# Patient Record
Sex: FEMALE | Race: ASIAN | NOT HISPANIC OR LATINO
[De-identification: names, ages, dates, MRNs, and addresses within clinical notes are randomized per-mention and may not be internally consistent; named-entity substitution may affect disease eponyms.]

---

## 2022-05-03 ENCOUNTER — APPOINTMENT (OUTPATIENT)
Dept: GASTROENTEROLOGY | Facility: CLINIC | Age: 47
End: 2022-05-03
Payer: COMMERCIAL

## 2022-05-03 VITALS
SYSTOLIC BLOOD PRESSURE: 130 MMHG | BODY MASS INDEX: 22.99 KG/M2 | WEIGHT: 138 LBS | HEIGHT: 65 IN | DIASTOLIC BLOOD PRESSURE: 90 MMHG

## 2022-05-03 DIAGNOSIS — Z12.11 ENCOUNTER FOR SCREENING FOR MALIGNANT NEOPLASM OF COLON: ICD-10-CM

## 2022-05-03 DIAGNOSIS — Z78.9 OTHER SPECIFIED HEALTH STATUS: ICD-10-CM

## 2022-05-03 PROCEDURE — 99204 OFFICE O/P NEW MOD 45 MIN: CPT

## 2022-05-03 NOTE — HISTORY OF PRESENT ILLNESS
[FreeTextEntry1] : She  is an  asymptomatic 46-year-old female referred for a screening colonoscopy.  She denies any family history of colon cancer

## 2022-05-03 NOTE — ASSESSMENT
[FreeTextEntry1] : RUBI NOYOLA was advised to undergo colonoscopy to which she agreed. The procedure will be performed in [facility] \par [facility] with the assistance of an anesthesiologist. The patient was given a Suprep preparation prescription and understood the \par procedure as it was explained to her. She was given a booklet distributed by the [employer]\par  Endoscopy explaining the procedure in detail and she understood the risks of the procedure not limited to infection, bleeding,\par perforation or non- diagnosis of colorectal cancer. She was advised that she could not drive home, if she chooses to\par  receive sedation.\par \par Further diagnostic and treatment recommendations will be based upon the procedure and any biopsies, if they are taken.\par \par Thank you for allowing me to participate in this [facility] health care.\par \par , Best personal regards -- Don\par

## 2022-05-03 NOTE — CONSULT LETTER
[Dear  ___] : Dear  [unfilled], [Consult Letter:] : I had the pleasure of evaluating your patient, [unfilled]. [( Thank you for referring [unfilled] for consultation for _____ )] : Thank you for referring [unfilled] for consultation for [unfilled] [Please see my note below.] : Please see my note below. [Consult Closing:] : Thank you very much for allowing me to participate in the care of this patient.  If you have any questions, please do not hesitate to contact me. [Sincerely,] : Sincerely, [FreeTextEntry3] : Don\par \par Jose Elam MD\par \par Gastroenterology\par [facility]\par [facility]\par \par

## 2022-06-29 ENCOUNTER — APPOINTMENT (OUTPATIENT)
Dept: GASTROENTEROLOGY | Facility: AMBULATORY SURGERY CENTER | Age: 47
End: 2022-06-29

## 2022-06-29 ENCOUNTER — RESULT REVIEW (OUTPATIENT)
Age: 47
End: 2022-06-29

## 2022-06-29 PROCEDURE — 45380 COLONOSCOPY AND BIOPSY: CPT | Mod: 33

## 2023-08-30 ENCOUNTER — EMERGENCY (EMERGENCY)
Facility: HOSPITAL | Age: 48
LOS: 1 days | Discharge: HOME | End: 2023-08-30
Attending: EMERGENCY MEDICINE
Payer: COMMERCIAL

## 2023-08-30 VITALS
SYSTOLIC BLOOD PRESSURE: 148 MMHG | OXYGEN SATURATION: 99 % | HEART RATE: 87 BPM | TEMPERATURE: 99 F | RESPIRATION RATE: 20 BRPM | HEIGHT: 65 IN | DIASTOLIC BLOOD PRESSURE: 101 MMHG | WEIGHT: 139.99 LBS

## 2023-08-30 VITALS
RESPIRATION RATE: 16 BRPM | OXYGEN SATURATION: 97 % | SYSTOLIC BLOOD PRESSURE: 135 MMHG | DIASTOLIC BLOOD PRESSURE: 87 MMHG | TEMPERATURE: 98 F | HEART RATE: 77 BPM

## 2023-08-30 PROCEDURE — 73552 X-RAY EXAM OF FEMUR 2/>: CPT | Mod: 26,RT

## 2023-08-30 PROCEDURE — 73501 X-RAY EXAM HIP UNI 1 VIEW: CPT

## 2023-08-30 PROCEDURE — 99284 EMERGENCY DEPT VISIT MOD MDM: CPT

## 2023-08-30 PROCEDURE — 73552 X-RAY EXAM OF FEMUR 2/>: CPT

## 2023-08-30 PROCEDURE — 73560 X-RAY EXAM OF KNEE 1 OR 2: CPT | Mod: 26,RT

## 2023-08-30 PROCEDURE — 73560 X-RAY EXAM OF KNEE 1 OR 2: CPT

## 2023-08-30 PROCEDURE — 99284 EMERGENCY DEPT VISIT MOD MDM: CPT | Mod: 25

## 2023-08-30 PROCEDURE — 73501 X-RAY EXAM HIP UNI 1 VIEW: CPT | Mod: 26,RT

## 2023-08-30 RX ORDER — IBUPROFEN 200 MG
600 TABLET ORAL ONCE
Refills: 0 | Status: COMPLETED | OUTPATIENT
Start: 2023-08-30 | End: 2023-08-30

## 2023-08-30 RX ADMIN — Medication 600 MILLIGRAM(S): at 21:31

## 2023-08-30 NOTE — ED PROVIDER NOTE - PHYSICAL EXAMINATION
CONSTITUTIONAL: Well appearing, awake, alert, oriented to person, place, time/situation and in no apparent distress.  ENMT: Airway patent, Nasal mucosa clear. Mouth with normal mucosa. No rubin sign no racooon eyes, no intraoral trauma  EYES: Clear bilaterally, pupils equal, round and reactive to light.  CARDIAC: Normal rate, regular rhythm.  Heart sounds S1, S2.  No murmurs, rubs or gallops.  RESPIRATORY: Breath sounds clear and equal bilaterally.   GASTROINTESTINAL: soft, nontender, no rebound, no guarding   MUSCULOSKELETAL: right sided TTP along greater trochanter and anterior hip although full ROM, full ROM of right knee with minimal lateral joint line TTP but no effusion  NEUROLOGICAL: Alert and oriented, no focal deficits, no motor or sensory deficits.   SKIN: No bruising, no lacerations, no rash, no signs of external trauma CONSTITUTIONAL: Well appearing, awake, alert, oriented to person, place, time/situation and in no apparent distress.  ENMT: Airway patent, Nasal mucosa clear. Mouth with normal mucosa. No rubin sign no racooon eyes, no intraoral trauma  EYES: Clear bilaterally, pupils equal, round and reactive to light.  CARDIAC: Normal rate, regular rhythm.  Heart sounds S1, S2.  No murmurs, rubs or gallops.  RESPIRATORY: Breath sounds clear and equal bilaterally.   GASTROINTESTINAL: soft, nontender, no rebound, no guarding   MUSCULOSKELETAL: right sided TTP along greater trochanter and anterior hip although full ROM, full ROM of right knee with minimal lateral joint line TTP but no effusion, no TTP over left elbow or left radius/ulna  NEUROLOGICAL: Alert and oriented, no focal deficits, no motor or sensory deficits.   SKIN: No bruising, no lacerations, no rash, no signs of external trauma

## 2023-08-30 NOTE — ED PROVIDER NOTE - NSFOLLOWUPINSTRUCTIONS_ED_ALL_ED_FT
You were seen in the emergency department after a fall.  We did x-ray imaging of your hip and knee which showed no injury.  This is likely either a soft tissue bruise or a deep bone bruise which will improve with time.  Continue to rest as needed, continue to take Tylenol Motrin as needed, please follow-up with your primary care doctor.

## 2023-08-30 NOTE — ED PROVIDER NOTE - CLINICAL SUMMARY MEDICAL DECISION MAKING FREE TEXT BOX
47-year-old female history of vertigo presenting with a mechanical fall after tripping on a object in the office and falling forward landing on her hip.  No obvious signs of bruising has good full range of motion which is reassuring but has tenderness and a limp to the right side along the hip and knee.  We will get plain film imaging.  Given lack of head strike will hold off on CT scan of the head after discussion with patient 47-year-old female history of vertigo presenting with a mechanical fall after tripping on a object in the office and falling forward landing on her hip.  No obvious signs of bruising has good full range of motion which is reassuring but has tenderness and a limp to the right side along the hip and knee.  We will get plain film imaging.  Given lack of head strike will hold off on CT scan of the head after discussion with patient  Frankie:  47 year old female s/p mehcanical fall after trippin on object in office and falling. PE: alert, nad, nc/at, nonlabored respirations, + right sided ttp along greatrochanter, anterior hip. FROM of right knee and right hip.  + ttp of right kknee. alert and oriented x 3, will get imaging. no head injury/c-spine injury. will give pain control, reassess.

## 2023-08-30 NOTE — ED PROVIDER NOTE - OBJECTIVE STATEMENT
47-year-old female with a history of vertigo otherwise no medical problems presenting to the emergency department after what sounds to be a mechanical fall earlier today around noon.  Patient was walking, tripped over a something in the office and fell forward.  She did not hit her head did not lose consciousness mostly landed on the right hip and leg.  She been able to walk on it throughout the day but has been having increasing pain despite ibuprofen.  She initially had some left arm pain but that is since improved.  No pain to the back, no headache no difficulty ambulating

## 2023-08-30 NOTE — ED ADULT NURSE NOTE - NSFALLRISKINTERV_ED_ALL_ED

## 2023-08-30 NOTE — ED ADULT NURSE NOTE - OBJECTIVE STATEMENT
patient is a 47 y/o F with no pertinent PMH who presents to the ED c/o right hip pain s/p mechanical fall. patient states that she was walking in her office when she tripped on something, fell forward mostly landing on her right hip. patient ambulatory after the incident however pain has not improved with tylenol taken PTA. patient with full ROM. cap refill <2 seconds, sensation intact. resting in NAD. MD aCrney at bedside to assess. pending XRs. updated on plan of care. comfort and safety maintained

## 2023-08-30 NOTE — ED PROVIDER NOTE - PATIENT PORTAL LINK FT
You can access the FollowMyHealth Patient Portal offered by [facility] by registering at the following website: [email or web address]. By joining [facility]’s FollowMyHealth portal, you will also be able to view your health information using other applications (apps) compatible with our system.

## 2023-08-30 NOTE — ED PROVIDER NOTE - PROGRESS NOTE DETAILS
X-ray is negative patient able to ambulate without issue, she is on her feet often for work, discussed rest, evaluation with her primary care doctor should symptoms persist X-ray is negative patient able to ambulate without issue, she is on her feet often for work, discussed rest, evaluation with her primary care doctor should symptoms persist.

## 2024-02-21 PROBLEM — R42 DIZZINESS AND GIDDINESS: Chronic | Status: ACTIVE | Noted: 2023-08-30

## 2024-06-19 ENCOUNTER — APPOINTMENT (OUTPATIENT)
Dept: OTOLARYNGOLOGY | Facility: CLINIC | Age: 49
End: 2024-06-19

## 2024-06-19 VITALS
HEART RATE: 83 BPM | HEIGHT: 65 IN | BODY MASS INDEX: 22.99 KG/M2 | DIASTOLIC BLOOD PRESSURE: 86 MMHG | WEIGHT: 138 LBS | SYSTOLIC BLOOD PRESSURE: 128 MMHG

## 2024-06-19 DIAGNOSIS — H90.3 SENSORINEURAL HEARING LOSS, BILATERAL: ICD-10-CM

## 2024-06-19 DIAGNOSIS — Z80.0 FAMILY HISTORY OF MALIGNANT NEOPLASM OF DIGESTIVE ORGANS: ICD-10-CM

## 2024-06-19 PROCEDURE — 99203 OFFICE O/P NEW LOW 30 MIN: CPT

## 2024-06-19 PROCEDURE — 92567 TYMPANOMETRY: CPT

## 2024-06-19 PROCEDURE — 92557 COMPREHENSIVE HEARING TEST: CPT

## 2024-06-19 RX ORDER — CALCIUM CARBONATE 260MG(650)
TABLET,CHEWABLE ORAL
Refills: 0 | Status: ACTIVE | COMMUNITY

## 2024-06-19 RX ORDER — POLYETHYLENE GLYCOL-3350 AND ELECTROLYTES WITH FLAVOR PACK 240; 5.84; 2.98; 6.72; 22.72 G/278.26G; G/278.26G; G/278.26G; G/278.26G; G/278.26G
240 POWDER, FOR SOLUTION ORAL
Qty: 1 | Refills: 0 | Status: DISCONTINUED | COMMUNITY
Start: 2022-05-24 | End: 2024-06-19

## 2024-06-19 RX ORDER — SODIUM SULFATE, POTASSIUM SULFATE, MAGNESIUM SULFATE 17.5; 3.13; 1.6 G/ML; G/ML; G/ML
17.5-3.13-1.6 SOLUTION, CONCENTRATE ORAL TWICE DAILY
Qty: 2 | Refills: 0 | Status: DISCONTINUED | COMMUNITY
Start: 2022-05-03 | End: 2024-06-19

## 2024-06-19 RX ORDER — SODIUM SULFATE, POTASSIUM SULFATE, MAGNESIUM SULFATE 17.5; 3.13; 1.6 G/ML; G/ML; G/ML
17.5-3.13-1.6 SOLUTION, CONCENTRATE ORAL TWICE DAILY
Qty: 2 | Refills: 0 | Status: DISCONTINUED | COMMUNITY
Start: 2022-05-22 | End: 2024-06-19

## 2024-06-19 RX ORDER — ASCORBIC ACID 500 MG
TABLET ORAL
Refills: 0 | Status: ACTIVE | COMMUNITY

## 2024-06-19 RX ORDER — CHOLECALCIFEROL (VITAMIN D3) 25 MCG
TABLET ORAL
Refills: 0 | Status: ACTIVE | COMMUNITY

## 2024-06-19 RX ORDER — OMEGA-3/DHA/EPA/FISH OIL 300-1000MG
CAPSULE ORAL
Refills: 0 | Status: ACTIVE | COMMUNITY

## 2024-06-19 RX ORDER — MULTIVITAMIN
TABLET ORAL
Refills: 0 | Status: ACTIVE | COMMUNITY

## 2024-06-19 NOTE — DATA REVIEWED
[de-identified] : Right - mild to moderate HL after 4000Hz Left - moderate to profound mixed hearing loss  Impedance testing reveals normal Type A tympanograms bilaterally

## 2024-06-27 ENCOUNTER — NON-APPOINTMENT (OUTPATIENT)
Age: 49
End: 2024-06-27

## 2024-07-05 ENCOUNTER — APPOINTMENT (OUTPATIENT)
Dept: MRI IMAGING | Facility: CLINIC | Age: 49
End: 2024-07-05

## 2024-07-10 ENCOUNTER — APPOINTMENT (OUTPATIENT)
Dept: PODIATRY | Facility: CLINIC | Age: 49
End: 2024-07-10

## 2024-07-12 ENCOUNTER — APPOINTMENT (OUTPATIENT)
Dept: PHARMACY | Facility: CLINIC | Age: 49
End: 2024-07-12
Payer: SELF-PAY

## 2024-07-12 PROCEDURE — V5010 ASSESSMENT FOR HEARING AID: CPT | Mod: NC

## 2024-07-19 ENCOUNTER — NON-APPOINTMENT (OUTPATIENT)
Age: 49
End: 2024-07-19

## 2024-08-01 ENCOUNTER — APPOINTMENT (OUTPATIENT)
Dept: PHARMACY | Facility: CLINIC | Age: 49
End: 2024-08-01
Payer: SELF-PAY

## 2024-08-01 PROCEDURE — V5261A: CUSTOM

## 2024-08-01 NOTE — HISTORY OF PRESENT ILLNESS
[FreeTextEntry1] : 48 year old female patient with asymmetrical hearing loss-hearing within normal limits thru 4kHz with moderate HL 6kHz-8kHz, right ear, and a severe to profound mixed hearing loss, left ear with 32% discrimination. Patient reports left hearing loss that occurred post trauma to the ear 25 years ago. Patient tried a hearing aid approximately 20- years ago and did not find benefit. 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 HHIE score of 88-revealing significant hearing handicap. Patient additionally reports tinnitus in the left ear.

## 2024-08-15 ENCOUNTER — APPOINTMENT (OUTPATIENT)
Dept: PHARMACY | Facility: CLINIC | Age: 49
End: 2024-08-15
Payer: SELF-PAY

## 2024-08-15 PROCEDURE — V5299A: CUSTOM

## 2024-09-18 ENCOUNTER — APPOINTMENT (OUTPATIENT)
Dept: OTOLARYNGOLOGY | Facility: CLINIC | Age: 49
End: 2024-09-18
Payer: COMMERCIAL

## 2024-09-18 ENCOUNTER — APPOINTMENT (OUTPATIENT)
Dept: PHARMACY | Facility: CLINIC | Age: 49
End: 2024-09-18
Payer: SELF-PAY

## 2024-09-18 VITALS
BODY MASS INDEX: 22.99 KG/M2 | HEIGHT: 65 IN | SYSTOLIC BLOOD PRESSURE: 149 MMHG | WEIGHT: 138 LBS | HEART RATE: 75 BPM | DIASTOLIC BLOOD PRESSURE: 86 MMHG

## 2024-09-18 DIAGNOSIS — J31.0 CHRONIC RHINITIS: ICD-10-CM

## 2024-09-18 DIAGNOSIS — H90.3 SENSORINEURAL HEARING LOSS, BILATERAL: ICD-10-CM

## 2024-09-18 PROCEDURE — V5299A: CUSTOM

## 2024-09-18 PROCEDURE — 99213 OFFICE O/P EST LOW 20 MIN: CPT

## 2024-09-18 RX ORDER — FLUTICASONE PROPIONATE 50 UG/1
50 SPRAY, METERED NASAL
Qty: 3 | Refills: 3 | Status: ACTIVE | COMMUNITY
Start: 2024-09-18 | End: 1900-01-01

## 2024-09-18 NOTE — HISTORY OF PRESENT ILLNESS
[de-identified] : 48 year old woman presents for follow-up for asymmetric SNHL. Patient report she got bilateral hearing aids and they are working - hearing slightly better. Reports stable left tinnitus and occasional sharp left otalgia("like a needle is going into my ear"). History of vertigo, symptoms come and go, not on any medication. Denies current otorrhea, recent fevers/ear infections or headaches related to hearing.

## 2024-09-26 ENCOUNTER — NON-APPOINTMENT (OUTPATIENT)
Age: 49
End: 2024-09-26

## 2024-10-17 ENCOUNTER — APPOINTMENT (OUTPATIENT)
Dept: PHARMACY | Facility: CLINIC | Age: 49
End: 2024-10-17
Payer: SELF-PAY

## 2024-10-17 PROCEDURE — V5299A: CUSTOM

## 2024-12-20 NOTE — ED ADULT TRIAGE NOTE - SOURCE OF INFORMATION
"-Wash face every morning with face wash that contains \"benzyl peroxide\"  -At night-time, wash with a gentle cleanser without benzyl peroxide, then apply Retin-A cream to face. Start with every other night at first, then increase to nightly if tolerated.   -It is important to moisturize and wear sunscreen when using Retin-A.  -If no improvement after 6-8 weeks, let me know and we will refer to dermatology    Call [phone] to make sports medicine appt with Dr Hummel.     " Patient

## 2025-05-08 ENCOUNTER — APPOINTMENT (OUTPATIENT)
Dept: OTOLARYNGOLOGY | Facility: CLINIC | Age: 50
End: 2025-05-08

## 2025-05-08 VITALS — SYSTOLIC BLOOD PRESSURE: 142 MMHG | DIASTOLIC BLOOD PRESSURE: 87 MMHG | HEART RATE: 71 BPM

## 2025-05-08 VITALS — WEIGHT: 135 LBS | BODY MASS INDEX: 22.49 KG/M2 | HEIGHT: 65 IN

## 2025-05-08 PROCEDURE — 99212 OFFICE O/P EST SF 10 MIN: CPT

## 2025-05-08 PROCEDURE — 92567 TYMPANOMETRY: CPT

## 2025-05-08 PROCEDURE — 92557 COMPREHENSIVE HEARING TEST: CPT

## 2025-06-13 ENCOUNTER — APPOINTMENT (OUTPATIENT)
Dept: PHARMACY | Facility: CLINIC | Age: 50
End: 2025-06-13
Payer: SELF-PAY

## 2025-06-13 PROCEDURE — V5299A: CUSTOM | Mod: NC

## 2025-07-31 ENCOUNTER — APPOINTMENT (OUTPATIENT)
Dept: PHARMACY | Facility: CLINIC | Age: 50
End: 2025-07-31